# Patient Record
Sex: MALE | Race: BLACK OR AFRICAN AMERICAN | NOT HISPANIC OR LATINO | Employment: FULL TIME | URBAN - METROPOLITAN AREA
[De-identification: names, ages, dates, MRNs, and addresses within clinical notes are randomized per-mention and may not be internally consistent; named-entity substitution may affect disease eponyms.]

---

## 2021-09-07 ENCOUNTER — OFFICE VISIT (OUTPATIENT)
Dept: UROLOGY | Facility: CLINIC | Age: 66
End: 2021-09-07
Payer: MEDICARE

## 2021-09-07 DIAGNOSIS — N48.6 PEYRONIE'S DISEASE: ICD-10-CM

## 2021-09-07 DIAGNOSIS — N52.9 ERECTILE DYSFUNCTION, UNSPECIFIED ERECTILE DYSFUNCTION TYPE: Primary | ICD-10-CM

## 2021-09-07 DIAGNOSIS — C61 PROSTATE CANCER: ICD-10-CM

## 2021-09-07 DIAGNOSIS — N40.1 BPH WITH OBSTRUCTION/LOWER URINARY TRACT SYMPTOMS: ICD-10-CM

## 2021-09-07 DIAGNOSIS — N13.8 BPH WITH OBSTRUCTION/LOWER URINARY TRACT SYMPTOMS: ICD-10-CM

## 2021-09-07 LAB — PSA, DIAGNOSTIC: 0.62 NG/ML (ref 0–4)

## 2021-09-07 PROCEDURE — 1160F PR REVIEW ALL MEDS BY PRESCRIBER/CLIN PHARMACIST DOCUMENTED: ICD-10-PCS | Mod: CPTII,S$GLB,, | Performed by: UROLOGY

## 2021-09-07 PROCEDURE — 99204 OFFICE O/P NEW MOD 45 MIN: CPT | Mod: S$GLB,,, | Performed by: UROLOGY

## 2021-09-07 PROCEDURE — 1159F PR MEDICATION LIST DOCUMENTED IN MEDICAL RECORD: ICD-10-PCS | Mod: CPTII,S$GLB,, | Performed by: UROLOGY

## 2021-09-07 PROCEDURE — 1160F RVW MEDS BY RX/DR IN RCRD: CPT | Mod: CPTII,S$GLB,, | Performed by: UROLOGY

## 2021-09-07 PROCEDURE — 1159F MED LIST DOCD IN RCRD: CPT | Mod: CPTII,S$GLB,, | Performed by: UROLOGY

## 2021-09-07 PROCEDURE — 99204 PR OFFICE/OUTPT VISIT, NEW, LEVL IV, 45-59 MIN: ICD-10-PCS | Mod: S$GLB,,, | Performed by: UROLOGY

## 2021-09-07 RX ORDER — LISINOPRIL 20 MG/1
TABLET ORAL
COMMUNITY
Start: 2021-08-19

## 2021-09-07 RX ORDER — ROSUVASTATIN CALCIUM 20 MG/1
TABLET, COATED ORAL
COMMUNITY
Start: 2021-08-19 | End: 2022-09-08

## 2021-09-07 RX ORDER — TADALAFIL 20 MG/1
TABLET ORAL
COMMUNITY

## 2021-09-07 RX ORDER — TAMSULOSIN HYDROCHLORIDE 0.4 MG/1
CAPSULE ORAL
COMMUNITY
Start: 2021-09-01 | End: 2021-09-07 | Stop reason: SDUPTHER

## 2021-09-07 RX ORDER — DOXAZOSIN 4 MG/1
TABLET ORAL
COMMUNITY
Start: 2021-08-19 | End: 2022-09-08

## 2021-09-07 RX ORDER — LURASIDONE HYDROCHLORIDE 80 MG/1
TABLET, FILM COATED ORAL
COMMUNITY
Start: 2021-05-20

## 2021-09-07 RX ORDER — DIVALPROEX SODIUM 500 MG/1
TABLET, FILM COATED, EXTENDED RELEASE ORAL
COMMUNITY

## 2021-09-07 RX ORDER — BUPROPION HYDROCHLORIDE 100 MG/1
TABLET ORAL
COMMUNITY

## 2021-09-07 RX ORDER — HYDROCHLOROTHIAZIDE 25 MG/1
TABLET ORAL
COMMUNITY

## 2021-09-07 RX ORDER — VENLAFAXINE 75 MG/1
TABLET ORAL
COMMUNITY

## 2021-09-08 RX ORDER — TAMSULOSIN HYDROCHLORIDE 0.4 MG/1
CAPSULE ORAL
Qty: 30 CAPSULE | Refills: 11 | Status: SHIPPED | OUTPATIENT
Start: 2021-09-08 | End: 2022-09-08

## 2021-09-15 ENCOUNTER — TELEPHONE (OUTPATIENT)
Dept: UROLOGY | Facility: CLINIC | Age: 66
End: 2021-09-15

## 2022-09-08 ENCOUNTER — OFFICE VISIT (OUTPATIENT)
Dept: UROLOGY | Facility: CLINIC | Age: 67
End: 2022-09-08
Payer: MEDICARE

## 2022-09-08 VITALS
SYSTOLIC BLOOD PRESSURE: 143 MMHG | TEMPERATURE: 98 F | HEART RATE: 61 BPM | BODY MASS INDEX: 31.83 KG/M2 | DIASTOLIC BLOOD PRESSURE: 87 MMHG | WEIGHT: 235 LBS | HEIGHT: 72 IN | RESPIRATION RATE: 18 BRPM

## 2022-09-08 DIAGNOSIS — N52.9 ERECTILE DYSFUNCTION, UNSPECIFIED ERECTILE DYSFUNCTION TYPE: ICD-10-CM

## 2022-09-08 DIAGNOSIS — N13.8 BPH WITH OBSTRUCTION/LOWER URINARY TRACT SYMPTOMS: Primary | ICD-10-CM

## 2022-09-08 DIAGNOSIS — N48.6 PEYRONIE'S DISEASE: ICD-10-CM

## 2022-09-08 DIAGNOSIS — N40.1 BPH WITH OBSTRUCTION/LOWER URINARY TRACT SYMPTOMS: Primary | ICD-10-CM

## 2022-09-08 LAB — PSA, DIAGNOSTIC: 1.85 NG/ML (ref 0–4)

## 2022-09-08 PROCEDURE — 1126F PR PAIN SEVERITY QUANTIFIED, NO PAIN PRESENT: ICD-10-PCS | Mod: CPTII,S$GLB,, | Performed by: UROLOGY

## 2022-09-08 PROCEDURE — 3008F BODY MASS INDEX DOCD: CPT | Mod: CPTII,S$GLB,, | Performed by: UROLOGY

## 2022-09-08 PROCEDURE — 3079F PR MOST RECENT DIASTOLIC BLOOD PRESSURE 80-89 MM HG: ICD-10-PCS | Mod: CPTII,S$GLB,, | Performed by: UROLOGY

## 2022-09-08 PROCEDURE — 1159F MED LIST DOCD IN RCRD: CPT | Mod: CPTII,S$GLB,, | Performed by: UROLOGY

## 2022-09-08 PROCEDURE — 1160F RVW MEDS BY RX/DR IN RCRD: CPT | Mod: CPTII,S$GLB,, | Performed by: UROLOGY

## 2022-09-08 PROCEDURE — 99214 OFFICE O/P EST MOD 30 MIN: CPT | Mod: S$GLB,,, | Performed by: UROLOGY

## 2022-09-08 PROCEDURE — 3077F PR MOST RECENT SYSTOLIC BLOOD PRESSURE >= 140 MM HG: ICD-10-PCS | Mod: CPTII,S$GLB,, | Performed by: UROLOGY

## 2022-09-08 PROCEDURE — 1159F PR MEDICATION LIST DOCUMENTED IN MEDICAL RECORD: ICD-10-PCS | Mod: CPTII,S$GLB,, | Performed by: UROLOGY

## 2022-09-08 PROCEDURE — 1160F PR REVIEW ALL MEDS BY PRESCRIBER/CLIN PHARMACIST DOCUMENTED: ICD-10-PCS | Mod: CPTII,S$GLB,, | Performed by: UROLOGY

## 2022-09-08 PROCEDURE — 1126F AMNT PAIN NOTED NONE PRSNT: CPT | Mod: CPTII,S$GLB,, | Performed by: UROLOGY

## 2022-09-08 PROCEDURE — 3008F PR BODY MASS INDEX (BMI) DOCUMENTED: ICD-10-PCS | Mod: CPTII,S$GLB,, | Performed by: UROLOGY

## 2022-09-08 PROCEDURE — 1101F PR PT FALLS ASSESS DOC 0-1 FALLS W/OUT INJ PAST YR: ICD-10-PCS | Mod: CPTII,S$GLB,, | Performed by: UROLOGY

## 2022-09-08 PROCEDURE — 3288F PR FALLS RISK ASSESSMENT DOCUMENTED: ICD-10-PCS | Mod: CPTII,S$GLB,, | Performed by: UROLOGY

## 2022-09-08 PROCEDURE — 3288F FALL RISK ASSESSMENT DOCD: CPT | Mod: CPTII,S$GLB,, | Performed by: UROLOGY

## 2022-09-08 PROCEDURE — 99214 PR OFFICE/OUTPT VISIT, EST, LEVL IV, 30-39 MIN: ICD-10-PCS | Mod: S$GLB,,, | Performed by: UROLOGY

## 2022-09-08 PROCEDURE — 3077F SYST BP >= 140 MM HG: CPT | Mod: CPTII,S$GLB,, | Performed by: UROLOGY

## 2022-09-08 PROCEDURE — 3079F DIAST BP 80-89 MM HG: CPT | Mod: CPTII,S$GLB,, | Performed by: UROLOGY

## 2022-09-08 PROCEDURE — 1101F PT FALLS ASSESS-DOCD LE1/YR: CPT | Mod: CPTII,S$GLB,, | Performed by: UROLOGY

## 2022-09-08 RX ORDER — CALCIUM CARB/VITAMIN D3/VIT K1 500-500-40
TABLET,CHEWABLE ORAL
COMMUNITY

## 2022-09-08 RX ORDER — ASPIRIN 81 MG/1
TABLET ORAL
COMMUNITY

## 2022-09-08 RX ORDER — SILDENAFIL 100 MG/1
100 TABLET, FILM COATED ORAL DAILY PRN
Qty: 30 TABLET | Refills: 11 | Status: SHIPPED | OUTPATIENT
Start: 2022-09-08 | End: 2023-09-08

## 2022-09-08 RX ORDER — PHENYLPROPANOLAMINE/CLEMASTINE 75-1.34MG
TABLET, EXTENDED RELEASE ORAL
COMMUNITY

## 2022-09-08 RX ORDER — METFORMIN HYDROCHLORIDE 1000 MG/1
TABLET, FILM COATED, EXTENDED RELEASE ORAL
COMMUNITY

## 2022-09-08 RX ORDER — ROSUVASTATIN CALCIUM 40 MG/1
TABLET, COATED ORAL
COMMUNITY

## 2022-09-08 NOTE — PROGRESS NOTES
Subjective:       Patient ID: Vaibhav Patel is a 66 y.o. male.    Chief Complaint: No chief complaint on file.      HPI:  66-year-old male follow-up for Peyronie's disease erectile dysfunction and BPH with obstructive symptoms.  Patient has discontinued his Flomax as he stated that it made him dizzy and overall he is doing fine.  Patient states he is at 30 degree curvature dorsally at the mid shaft he recently got  has been taking Cialis with some moderate improvement    Past Medical History:   Past Medical History:   Diagnosis Date    Diabetes mellitus     Hypertension        Past Surgical Historical: History reviewed. No pertinent surgical history.     Medications:   Medication List with Changes/Refills   Current Medications    ASPIRIN (ECOTRIN) 81 MG EC TABLET    Ecotrin Low Strength 81 mg tablet,enteric coated   Take 1 tablet every day by oral route.    BUPROPION (WELLBUTRIN) 100 MG TABLET    bupropion HCl 100 mg tablet   Take 1 tablet twice a day by oral route.    CHOLECALCIFEROL, VITAMIN D3, 10 MCG (400 UNIT) CAP CAPSULE    cholecalciferol (vitamin D3)    DIVALPROEX ER (DEPAKOTE) 500 MG TB24    divalproex  mg tablet,extended release 24 hr   Take 1 tablet every day by oral route.    HYDROCHLOROTHIAZIDE (HYDRODIURIL) 25 MG TABLET    hydrochlorothiazide 25 mg tablet   Take 1 tablet every day by oral route.    IBUPROFEN 200 MG CAP    ibuprofen 200 mg capsule   Take 1 capsule every 6 hours by oral route.    LISINOPRIL (PRINIVIL,ZESTRIL) 20 MG TABLET    lisinopril 20 mg tablet   Take 1 tablet every day by oral route.    LURASIDONE (LATUDA) 80 MG TAB TABLET    Latuda 80 mg tablet   Take 1 tablet every day by oral route.    METFORMIN (GLUMETZA) 1000 MG (MOD) 24HR TABLET    metformin ER 1,000 mg 24 hr tablet,extended release   Take 1 tablet every day by oral route.    ROSUVASTATIN (CRESTOR) 40 MG TAB    Crestor 40 mg tablet   Take 1 tablet every day by oral route.    TADALAFIL (CIALIS) 20 MG TAB     tadalafil 20 mg tablet   Take 1 tablet every day by oral route.    VENLAFAXINE (EFFEXOR) 75 MG TABLET    venlafaxine 75 mg tablet   Take 1 tablet twice a day by oral route.   Discontinued Medications    DOXAZOSIN (CARDURA) 4 MG TABLET    doxazosin 4 mg tablet   Take 1 tablet every day by oral route.    ROSUVASTATIN (CRESTOR) 20 MG TABLET    rosuvastatin 20 mg tablet   Take 1 tablet every day by oral route at bedtime.    TAMSULOSIN (FLOMAX) 0.4 MG CAP    tamsulosin 0.4 mg capsule  Take 1 capsule every day by oral route.        Past Social History:   Social History     Socioeconomic History    Marital status:    Tobacco Use    Smoking status: Former     Years: 3.00     Types: Cigarettes     Quit date:      Years since quittin.7    Smokeless tobacco: Never   Substance and Sexual Activity    Alcohol use: Not Currently    Sexual activity: Yes     Partners: Female       Allergies: Review of patient's allergies indicates:  No Known Allergies     Family History: History reviewed. No pertinent family history.     Review of Systems:  Review of Systems    Physical Exam:  Physical Exam    Assessment/Plan:       Problem List Items Addressed This Visit    None  Visit Diagnoses       BPH with obstruction/lower urinary tract symptoms    -  Primary    Relevant Orders    Prostate Specific Antigen, Diagnostic    Peyronie's disease        Erectile dysfunction, unspecified erectile dysfunction type                   Peyronie's disease:  I palpated the patient's penis do not feel an obvious plaque making it difficult to proceed with Xiaflex    Erectile dysfunction:  We will prescribe 100 mg of Viagra to the patient believe this should work well if he takes on an empty stomach if we can get him having better quality erections that believe the Peyronie's disease should improve    BPH with obstruction:  Patient is stable return to clinic in 1 year or sooner

## 2022-09-12 ENCOUNTER — TELEPHONE (OUTPATIENT)
Dept: UROLOGY | Facility: CLINIC | Age: 67
End: 2022-09-12
Payer: MEDICARE

## 2022-09-12 NOTE — TELEPHONE ENCOUNTER
Informed pt of results.     ----- Message from Kings Hernandez MD sent at 9/8/2022  4:26 PM CDT -----  Inform pt of stable PSA

## 2022-10-06 ENCOUNTER — TELEPHONE (OUTPATIENT)
Dept: UROLOGY | Facility: CLINIC | Age: 67
End: 2022-10-06
Payer: MEDICARE

## 2022-10-06 RX ORDER — TADALAFIL 20 MG/1
TABLET ORAL
Qty: 30 TABLET | Refills: 11 | Status: CANCELLED | OUTPATIENT
Start: 2022-10-06

## 2022-10-06 RX ORDER — TADALAFIL 20 MG/1
20 TABLET ORAL DAILY
Qty: 30 TABLET | Refills: 11 | Status: SHIPPED | OUTPATIENT
Start: 2022-10-06 | End: 2023-10-06

## 2022-10-06 NOTE — TELEPHONE ENCOUNTER
Pt called stating his viagra is not working for him. Dr. Hernandez has sent out cialis 20mg .   Pt will call back if this does not help him.

## 2022-10-06 NOTE — TELEPHONE ENCOUNTER
Attempted to return pt call. Left VM.    ----- Message from Rita Farfan MA sent at 10/6/2022  9:11 AM CDT -----  Contact: self    ----- Message -----  From: Yulissa Sigala MA  Sent: 10/5/2022   4:54 PM CDT  To: Rita Farfan MA      ----- Message -----  From: Jasen Michaud  Sent: 10/5/2022  12:51 PM CDT  To: [facility] Staff    Pt called and asked for a call back regarding the Viagra not working. Pt stated he tried it a coiple times and it did not work. Please call pt at [phone]

## 2022-11-21 ENCOUNTER — TELEPHONE (OUTPATIENT)
Dept: UROLOGY | Facility: CLINIC | Age: 67
End: 2022-11-21
Payer: MEDICARE

## 2022-11-21 NOTE — TELEPHONE ENCOUNTER
Contacted pt, scheduled to discuss further options. BJP     ----- Message from Missy Olivas sent at 11/21/2022 10:08 AM CST -----  Contact: self  Type:  Needs Medical Advice    Who Called: Vaibhav Patel   Symptoms (please be specific):  Has been prescribed tadalafiL (CIALIS) 20 MG Tab and he has tried sildenafil and those both don't work for him - wanting to know if he can try something else. Inquiring about a shot that he can give himself. Wants to see if that's an option.    Pharmacy name and phone #:    [facility] - [address] [address]  Phone: [phone] Fax: [phone]    Would the patient rather a call back or a response via [facility]? Call back   Best Call Back Number:  [phone]   Additional Information: n/a

## 2022-11-23 ENCOUNTER — OFFICE VISIT (OUTPATIENT)
Dept: UROLOGY | Facility: CLINIC | Age: 67
End: 2022-11-23
Payer: MEDICARE

## 2022-11-23 DIAGNOSIS — N52.9 ERECTILE DYSFUNCTION, UNSPECIFIED ERECTILE DYSFUNCTION TYPE: Primary | ICD-10-CM

## 2022-11-23 PROCEDURE — 99214 PR OFFICE/OUTPT VISIT, EST, LEVL IV, 30-39 MIN: ICD-10-PCS | Mod: S$GLB,,, | Performed by: FAMILY MEDICINE

## 2022-11-23 PROCEDURE — 1160F RVW MEDS BY RX/DR IN RCRD: CPT | Mod: CPTII,S$GLB,, | Performed by: FAMILY MEDICINE

## 2022-11-23 PROCEDURE — 1160F PR REVIEW ALL MEDS BY PRESCRIBER/CLIN PHARMACIST DOCUMENTED: ICD-10-PCS | Mod: CPTII,S$GLB,, | Performed by: FAMILY MEDICINE

## 2022-11-23 PROCEDURE — 99214 OFFICE O/P EST MOD 30 MIN: CPT | Mod: S$GLB,,, | Performed by: FAMILY MEDICINE

## 2022-11-23 PROCEDURE — 1159F PR MEDICATION LIST DOCUMENTED IN MEDICAL RECORD: ICD-10-PCS | Mod: CPTII,S$GLB,, | Performed by: FAMILY MEDICINE

## 2022-11-23 PROCEDURE — 1159F MED LIST DOCD IN RCRD: CPT | Mod: CPTII,S$GLB,, | Performed by: FAMILY MEDICINE

## 2022-11-23 RX ORDER — ALPROSTADIL 20 UG/ML
20 INJECTION, POWDER, LYOPHILIZED, FOR SOLUTION INTRACAVERNOUS
Qty: 4 EACH | Refills: 4 | Status: SHIPPED | OUTPATIENT
Start: 2022-11-23 | End: 2022-11-28

## 2022-11-23 NOTE — PROGRESS NOTES
Subjective:       Patient ID: Vaibhav Patel is a 67 y.o. male.    Chief Complaint: Erectile Dysfunction      HPI: Erectile Dysfunction   Patient has been on Cialis and Viagra with no improvement of erections.    Past Medical History:   Past Medical History:   Diagnosis Date    Diabetes mellitus     Hypertension        Past Surgical Historical: History reviewed. No pertinent surgical history.     Medications:   Medication List with Changes/Refills   New Medications    ALPROSTADIL (EDEX) 20 MCG INJECTION    20 mcg by Intracavitary route as needed for Erectile Dysfunction (15 minutes prior to sexual activity). use no more than 3 times per week   Current Medications    ASPIRIN (ECOTRIN) 81 MG EC TABLET    Ecotrin Low Strength 81 mg tablet,enteric coated   Take 1 tablet every day by oral route.    BUPROPION (WELLBUTRIN) 100 MG TABLET    bupropion HCl 100 mg tablet   Take 1 tablet twice a day by oral route.    CHOLECALCIFEROL, VITAMIN D3, 10 MCG (400 UNIT) CAP CAPSULE    cholecalciferol (vitamin D3)    DIVALPROEX ER (DEPAKOTE) 500 MG TB24    divalproex  mg tablet,extended release 24 hr   Take 1 tablet every day by oral route.    HYDROCHLOROTHIAZIDE (HYDRODIURIL) 25 MG TABLET    hydrochlorothiazide 25 mg tablet   Take 1 tablet every day by oral route.    IBUPROFEN 200 MG CAP    ibuprofen 200 mg capsule   Take 1 capsule every 6 hours by oral route.    LISINOPRIL (PRINIVIL,ZESTRIL) 20 MG TABLET    lisinopril 20 mg tablet   Take 1 tablet every day by oral route.    LURASIDONE (LATUDA) 80 MG TAB TABLET    Latuda 80 mg tablet   Take 1 tablet every day by oral route.    METFORMIN (GLUMETZA) 1000 MG (MOD) 24HR TABLET    metformin ER 1,000 mg 24 hr tablet,extended release   Take 1 tablet every day by oral route.    ROSUVASTATIN (CRESTOR) 40 MG TAB    Crestor 40 mg tablet   Take 1 tablet every day by oral route.    SILDENAFIL (VIAGRA) 100 MG TABLET    Take 1 tablet (100 mg total) by mouth daily as needed for Erectile  Dysfunction.    TADALAFIL (CIALIS) 20 MG TAB    tadalafil 20 mg tablet   Take 1 tablet every day by oral route.    TADALAFIL (CIALIS) 20 MG TAB    Take 1 tablet (20 mg total) by mouth once daily.    VENLAFAXINE (EFFEXOR) 75 MG TABLET    venlafaxine 75 mg tablet   Take 1 tablet twice a day by oral route.        Past Social History:   Social History     Socioeconomic History    Marital status:    Tobacco Use    Smoking status: Former     Years: 3.00     Types: Cigarettes     Quit date:      Years since quittin.9    Smokeless tobacco: Never   Substance and Sexual Activity    Alcohol use: Not Currently    Sexual activity: Yes     Partners: Female       Allergies: Review of patient's allergies indicates:  No Known Allergies     Family History: History reviewed. No pertinent family history.     Review of Systems:  Review of Systems   Constitutional: Negative.    HENT: Negative.     Eyes: Negative.    Respiratory: Negative.     Cardiovascular: Negative.    Gastrointestinal: Negative.    Endocrine: Negative.    Genitourinary: Negative.    Musculoskeletal: Negative.    Skin: Negative.    Allergic/Immunologic: Negative.    Neurological: Negative.    Hematological: Negative.    Psychiatric/Behavioral: Negative.       Physical Exam:  Physical Exam  Constitutional:       Appearance: He is normal weight.   HENT:      Head: Normocephalic.      Mouth/Throat:      Mouth: Mucous membranes are moist.      Pharynx: Oropharynx is clear.   Cardiovascular:      Rate and Rhythm: Normal rate.   Pulmonary:      Effort: Pulmonary effort is normal.   Genitourinary:     Penis: Normal.    Musculoskeletal:      Cervical back: Normal range of motion.   Neurological:      General: No focal deficit present.      Mental Status: He is alert and oriented to person, place, and time. Mental status is at baseline.   Psychiatric:         Mood and Affect: Mood normal.         Behavior: Behavior normal.         Thought Content: Thought  content normal.         Judgment: Judgment normal.     Assessment/Plan:       Erectile dysfunction: The patient has tried Cialis and Viagra with no improvement in erections. He would like to try trimix injections at this time. Discussed treatment options for ED. Patient verbalized understanding. We will prescribe Edex 20 mcg by Intracavitary route as needed for Erectile Dysfunction (15 minutes prior to sexual activity). [facility] the [facility] issues [facility] we can send Trimix to [facility] locally.       Problem List Items Addressed This Visit    None  Visit Diagnoses       Erectile dysfunction, unspecified erectile dysfunction type    -  Primary    Relevant Medications    alprostadiL (EDEX) 20 mcg injection

## 2022-11-28 ENCOUNTER — TELEPHONE (OUTPATIENT)
Dept: UROLOGY | Facility: CLINIC | Age: 67
End: 2022-11-28
Payer: MEDICARE

## 2022-11-28 DIAGNOSIS — N52.9 ERECTILE DYSFUNCTION, UNSPECIFIED ERECTILE DYSFUNCTION TYPE: Primary | ICD-10-CM

## 2022-11-28 RX ORDER — ALPROSTADIL 20 UG/ML
20 INJECTION, POWDER, LYOPHILIZED, FOR SOLUTION INTRACAVERNOUS
Qty: 4 EACH | Refills: 4 | Status: SHIPPED | OUTPATIENT
Start: 2022-11-28 | End: 2022-12-08

## 2022-11-28 NOTE — TELEPHONE ENCOUNTER
Pt calling regarding his medication being sent to wrong pharmacy. I have [facility] and resent edex medication.     ----- Message from Anusha Townsend, RT sent at 11/28/2022 10:42 AM CST -----  Patient calling about medication going to the wrong location and he needs to speak with someone to correct the problem,, please call patient back [phone]

## 2022-12-08 ENCOUNTER — TELEPHONE (OUTPATIENT)
Dept: UROLOGY | Facility: CLINIC | Age: 67
End: 2022-12-08
Payer: MEDICARE

## 2022-12-08 DIAGNOSIS — N52.9 ERECTILE DYSFUNCTION, UNSPECIFIED ERECTILE DYSFUNCTION TYPE: Primary | ICD-10-CM

## 2022-12-08 RX ORDER — ALPROSTADIL 20 UG/ML
20 INJECTION, POWDER, LYOPHILIZED, FOR SOLUTION INTRACAVERNOUS
Qty: 4 EACH | Refills: 4 | Status: SHIPPED | OUTPATIENT
Start: 2022-12-08 | End: 2023-12-08

## 2022-12-08 NOTE — TELEPHONE ENCOUNTER
Trimix medication sent out.     ----- Message from Jasen Michaud sent at 12/8/2022  2:07 PM CST -----  Contact: SELF  Pt called and asked if he can get his medication called out to the pharmacy? Pt stated he is willing to pay cash. Pt want script send to [facility]. Thanks

## 2022-12-08 NOTE — TELEPHONE ENCOUNTER
PT called asking who referred pt to us. I informed him that he made gallo himself. Medication that we sent out will not be covered without [facility]. I advised pt to [facility] and have them send over auth. Pt verbalized understanding. Pt ----- Message from Janny Mckay sent at 12/8/2022  1:24 PM CST -----  Contact: pt    Who Called:mark   Who Left Message for Patient:  Does the patient know what this is regarding?:pt wants to know who sent referral over for him to see urologist   Would the patient rather a call back or a response via [facility]?   Best Call Back Number:.[phone]    Additional Information:

## 2022-12-08 NOTE — TELEPHONE ENCOUNTER
Pt called stating he did not receive is ED(Edex) medication. I informed him we received a receipt from pharmacy but that we will resend medication.      ----- Message from Janny Mckay sent at 12/8/2022  8:22 AM CST -----  Contact: pt  .Type:  Patient Returning Call    Who Called:mark  Who Left Message for Patient:  Does the patient know what this is regarding?:ed rx has not been sent   Would the patient rather a call back or a response via [facility]?   Best Call Back Number:.[phone]    Additional Information: pt wants call back asap

## 2023-03-16 ENCOUNTER — OFFICE VISIT (OUTPATIENT)
Dept: UROLOGY | Facility: CLINIC | Age: 68
End: 2023-03-16
Payer: MEDICARE

## 2023-03-16 VITALS
RESPIRATION RATE: 18 BRPM | HEART RATE: 63 BPM | TEMPERATURE: 98 F | SYSTOLIC BLOOD PRESSURE: 121 MMHG | HEIGHT: 72 IN | DIASTOLIC BLOOD PRESSURE: 74 MMHG | BODY MASS INDEX: 33.32 KG/M2 | WEIGHT: 246 LBS

## 2023-03-16 DIAGNOSIS — N52.9 ERECTILE DYSFUNCTION, UNSPECIFIED ERECTILE DYSFUNCTION TYPE: Primary | ICD-10-CM

## 2023-03-16 PROCEDURE — 3078F DIAST BP <80 MM HG: CPT | Mod: CPTII,S$GLB,, | Performed by: UROLOGY

## 2023-03-16 PROCEDURE — 1126F PR PAIN SEVERITY QUANTIFIED, NO PAIN PRESENT: ICD-10-PCS | Mod: CPTII,S$GLB,, | Performed by: UROLOGY

## 2023-03-16 PROCEDURE — 99214 PR OFFICE/OUTPT VISIT, EST, LEVL IV, 30-39 MIN: ICD-10-PCS | Mod: S$GLB,,, | Performed by: UROLOGY

## 2023-03-16 PROCEDURE — 1160F RVW MEDS BY RX/DR IN RCRD: CPT | Mod: CPTII,S$GLB,, | Performed by: UROLOGY

## 2023-03-16 PROCEDURE — 1160F PR REVIEW ALL MEDS BY PRESCRIBER/CLIN PHARMACIST DOCUMENTED: ICD-10-PCS | Mod: CPTII,S$GLB,, | Performed by: UROLOGY

## 2023-03-16 PROCEDURE — 3074F SYST BP LT 130 MM HG: CPT | Mod: CPTII,S$GLB,, | Performed by: UROLOGY

## 2023-03-16 PROCEDURE — 1159F MED LIST DOCD IN RCRD: CPT | Mod: CPTII,S$GLB,, | Performed by: UROLOGY

## 2023-03-16 PROCEDURE — 3078F PR MOST RECENT DIASTOLIC BLOOD PRESSURE < 80 MM HG: ICD-10-PCS | Mod: CPTII,S$GLB,, | Performed by: UROLOGY

## 2023-03-16 PROCEDURE — 3074F PR MOST RECENT SYSTOLIC BLOOD PRESSURE < 130 MM HG: ICD-10-PCS | Mod: CPTII,S$GLB,, | Performed by: UROLOGY

## 2023-03-16 PROCEDURE — 1159F PR MEDICATION LIST DOCUMENTED IN MEDICAL RECORD: ICD-10-PCS | Mod: CPTII,S$GLB,, | Performed by: UROLOGY

## 2023-03-16 PROCEDURE — 1126F AMNT PAIN NOTED NONE PRSNT: CPT | Mod: CPTII,S$GLB,, | Performed by: UROLOGY

## 2023-03-16 PROCEDURE — 3288F FALL RISK ASSESSMENT DOCD: CPT | Mod: CPTII,S$GLB,, | Performed by: UROLOGY

## 2023-03-16 PROCEDURE — 99214 OFFICE O/P EST MOD 30 MIN: CPT | Mod: S$GLB,,, | Performed by: UROLOGY

## 2023-03-16 PROCEDURE — 3288F PR FALLS RISK ASSESSMENT DOCUMENTED: ICD-10-PCS | Mod: CPTII,S$GLB,, | Performed by: UROLOGY

## 2023-03-16 PROCEDURE — 1101F PT FALLS ASSESS-DOCD LE1/YR: CPT | Mod: CPTII,S$GLB,, | Performed by: UROLOGY

## 2023-03-16 PROCEDURE — 3008F PR BODY MASS INDEX (BMI) DOCUMENTED: ICD-10-PCS | Mod: CPTII,S$GLB,, | Performed by: UROLOGY

## 2023-03-16 PROCEDURE — 3008F BODY MASS INDEX DOCD: CPT | Mod: CPTII,S$GLB,, | Performed by: UROLOGY

## 2023-03-16 PROCEDURE — 1101F PR PT FALLS ASSESS DOC 0-1 FALLS W/OUT INJ PAST YR: ICD-10-PCS | Mod: CPTII,S$GLB,, | Performed by: UROLOGY

## 2023-03-16 NOTE — PROGRESS NOTES
Subjective:       Patient ID: Vaibhav Patel is a 67 y.o. male.    Chief Complaint: Erectile Dysfunction (On trimix but does not feel it works all the time. He is curious about a higher dose)      HPI:  67-year-old male with erectile dysfunction he was  back in August of 22 and is already unfortunately having some marital issues in our is  he had intermittent success with TriMix currently he does not have a partner for intercourse but he is interested in penile implant potentially    Past Medical History:   Past Medical History:   Diagnosis Date    Diabetes mellitus     Hypertension     Male erectile dysfunction, unspecified        Past Surgical Historical: History reviewed. No pertinent surgical history.     Medications:   Medication List with Changes/Refills   Current Medications    ALPROSTADIL (EDEX) 20 MCG INJECTION    20 mcg by Intracavitary route as needed for Erectile Dysfunction (15 minutes prior to sexual activity). use no more than 3 times per week    ASPIRIN (ECOTRIN) 81 MG EC TABLET    Ecotrin Low Strength 81 mg tablet,enteric coated   Take 1 tablet every day by oral route.    BUPROPION (WELLBUTRIN) 100 MG TABLET    bupropion HCl 100 mg tablet   Take 1 tablet twice a day by oral route.    CHOLECALCIFEROL, VITAMIN D3, 10 MCG (400 UNIT) CAP CAPSULE    cholecalciferol (vitamin D3)    DIVALPROEX ER (DEPAKOTE) 500 MG TB24    divalproex  mg tablet,extended release 24 hr   Take 1 tablet every day by oral route.    HYDROCHLOROTHIAZIDE (HYDRODIURIL) 25 MG TABLET    hydrochlorothiazide 25 mg tablet   Take 1 tablet every day by oral route.    IBUPROFEN 200 MG CAP    ibuprofen 200 mg capsule   Take 1 capsule every 6 hours by oral route.    LISINOPRIL (PRINIVIL,ZESTRIL) 20 MG TABLET    lisinopril 20 mg tablet   Take 1 tablet every day by oral route.    LURASIDONE (LATUDA) 80 MG TAB TABLET    Latuda 80 mg tablet   Take 1 tablet every day by oral route.    METFORMIN (GLUMETZA) 1000 MG (MOD) 24HR  TABLET    metformin ER 1,000 mg 24 hr tablet,extended release   Take 1 tablet every day by oral route.    ROSUVASTATIN (CRESTOR) 40 MG TAB    Crestor 40 mg tablet   Take 1 tablet every day by oral route.    SILDENAFIL (VIAGRA) 100 MG TABLET    Take 1 tablet (100 mg total) by mouth daily as needed for Erectile Dysfunction.    TADALAFIL (CIALIS) 20 MG TAB    tadalafil 20 mg tablet   Take 1 tablet every day by oral route.    TADALAFIL (CIALIS) 20 MG TAB    Take 1 tablet (20 mg total) by mouth once daily.    VENLAFAXINE (EFFEXOR) 75 MG TABLET    venlafaxine 75 mg tablet   Take 1 tablet twice a day by oral route.        Past Social History:   Social History     Socioeconomic History    Marital status:    Tobacco Use    Smoking status: Former     Years: 3.00     Types: Cigarettes     Quit date:      Years since quittin.    Smokeless tobacco: Never   Substance and Sexual Activity    Alcohol use: Not Currently    Sexual activity: Yes     Partners: Female       Allergies: Review of patient's allergies indicates:  No Known Allergies     Family History:   Family History   Problem Relation Age of Onset    No Known Problems Father     No Known Problems Mother         Review of Systems:  Review of Systems   Constitutional:  Negative for activity change and appetite change.   HENT:  Negative for congestion and dental problem.    Eyes:  Negative for visual disturbance.   Respiratory:  Negative for chest tightness and shortness of breath.    Cardiovascular:  Negative for chest pain.   Gastrointestinal:  Negative for abdominal distention and abdominal pain.   Endocrine: Negative for polyuria.   Genitourinary:  Negative for difficulty urinating, dysuria, flank pain, frequency, hematuria, penile discharge, penile pain, penile swelling, scrotal swelling, testicular pain and urgency.   Musculoskeletal:  Negative for back pain and neck pain.   Skin:  Negative for color change.   Allergic/Immunologic: Positive for  immunocompromised state.   Neurological:  Negative for dizziness.   Hematological:  Negative for adenopathy.   Psychiatric/Behavioral:  Negative for agitation, behavioral problems and confusion.      Physical Exam:  Physical Exam  Constitutional:       General: He is not in acute distress.     Appearance: He is well-developed.   HENT:      Head: Normocephalic and atraumatic.      Nose: Nose normal.   Eyes:      General: No scleral icterus.     Conjunctiva/sclera: Conjunctivae normal.      Pupils: Pupils are equal, round, and reactive to light.   Neck:      Thyroid: No thyromegaly.      Trachea: No tracheal deviation.   Cardiovascular:      Rate and Rhythm: Normal rate and regular rhythm.      Heart sounds: Normal heart sounds.   Pulmonary:      Effort: Pulmonary effort is normal. No respiratory distress.      Breath sounds: Normal breath sounds. No wheezing or rales.   Abdominal:      General: Bowel sounds are normal. There is no distension.      Palpations: Abdomen is soft.      Tenderness: There is no abdominal tenderness. There is no guarding or rebound.   Genitourinary:     Penis: Normal. No tenderness.       Prostate: Normal.   Musculoskeletal:         General: No deformity. Normal range of motion.      Cervical back: Neck supple.   Lymphadenopathy:      Cervical: No cervical adenopathy.   Skin:     General: Skin is warm and dry.      Findings: No erythema or rash.   Neurological:      Mental Status: He is alert and oriented to person, place, and time.      Cranial Nerves: No cranial nerve deficit.   Psychiatric:         Behavior: Behavior normal.       Assessment/Plan:       Problem List Items Addressed This Visit    None  Visit Diagnoses       Erectile dysfunction, unspecified erectile dysfunction type    -  Primary               Erectile dysfunction failed TriMix:  We would a long discussion about penile implant including that his penis will be shorter and have less girth only should really consider this if  TriMix does not work at all he is going to consider his options and call back if he is interested

## 2023-08-16 ENCOUNTER — TELEPHONE (OUTPATIENT)
Dept: UROLOGY | Facility: CLINIC | Age: 68
End: 2023-08-16
Payer: MEDICARE

## 2023-08-16 NOTE — TELEPHONE ENCOUNTER
----- Message from Missy Olivas sent at 8/16/2023 11:13 AM CDT -----  Contact: SELF  Type:  RX Refill Request    Who Called:  Vaibhav Patel   Refill or New Rx: REFILL  RX Name and Strength: trimix (requesting to try the highest dose)  How is the patient currently taking it? (ex. 1XDay): as needed    Preferred Pharmacy with phone number:  [facility] - [place], [place] - [address]  [address] [facility] [address]  Phone: [phone] Fax: [phone]     Local or Mail Order: LOCAL  Ordering Provider: [facility]  Would the patient rather a call back or a response via [facility]?  CALL BACK  Best Call Back Number: [phone]   Additional Information: N/A

## 2023-08-16 NOTE — TELEPHONE ENCOUNTER
Trimix refill approved by Dr. Hernandez and called into [facility] pharmacy. [facility]

## 2023-08-17 ENCOUNTER — TELEPHONE (OUTPATIENT)
Dept: UROLOGY | Facility: CLINIC | Age: 68
End: 2023-08-17
Payer: MEDICARE

## 2023-09-08 ENCOUNTER — CLINICAL SUPPORT (OUTPATIENT)
Dept: UROLOGY | Facility: CLINIC | Age: 68
End: 2023-09-08
Payer: MEDICARE

## 2023-09-08 DIAGNOSIS — N40.1 BPH WITH OBSTRUCTION/LOWER URINARY TRACT SYMPTOMS: Primary | ICD-10-CM

## 2023-09-08 DIAGNOSIS — N13.8 BPH WITH OBSTRUCTION/LOWER URINARY TRACT SYMPTOMS: Primary | ICD-10-CM

## 2023-09-08 LAB — PSA, DIAGNOSTIC: 0.8 NG/ML (ref 0.1–4)

## 2023-09-08 PROCEDURE — 36415 COLL VENOUS BLD VENIPUNCTURE: CPT | Mod: S$GLB,,, | Performed by: UROLOGY

## 2023-09-08 PROCEDURE — 36415 PR COLLECTION VENOUS BLOOD,VENIPUNCTURE: ICD-10-PCS | Mod: S$GLB,,, | Performed by: UROLOGY

## 2023-09-14 ENCOUNTER — OFFICE VISIT (OUTPATIENT)
Dept: UROLOGY | Facility: CLINIC | Age: 68
End: 2023-09-14
Payer: MEDICARE

## 2023-09-14 VITALS
HEIGHT: 72 IN | TEMPERATURE: 98 F | BODY MASS INDEX: 33.86 KG/M2 | HEART RATE: 68 BPM | WEIGHT: 250 LBS | DIASTOLIC BLOOD PRESSURE: 81 MMHG | SYSTOLIC BLOOD PRESSURE: 144 MMHG | OXYGEN SATURATION: 99 %

## 2023-09-14 DIAGNOSIS — N52.9 ERECTILE DYSFUNCTION, UNSPECIFIED ERECTILE DYSFUNCTION TYPE: Primary | ICD-10-CM

## 2023-09-14 PROCEDURE — 1160F PR REVIEW ALL MEDS BY PRESCRIBER/CLIN PHARMACIST DOCUMENTED: ICD-10-PCS | Mod: CPTII,S$GLB,, | Performed by: UROLOGY

## 2023-09-14 PROCEDURE — 1160F RVW MEDS BY RX/DR IN RCRD: CPT | Mod: CPTII,S$GLB,, | Performed by: UROLOGY

## 2023-09-14 PROCEDURE — 99215 PR OFFICE/OUTPT VISIT, EST, LEVL V, 40-54 MIN: ICD-10-PCS | Mod: S$GLB,,, | Performed by: UROLOGY

## 2023-09-14 PROCEDURE — 99215 OFFICE O/P EST HI 40 MIN: CPT | Mod: S$GLB,,, | Performed by: UROLOGY

## 2023-09-14 PROCEDURE — 3079F DIAST BP 80-89 MM HG: CPT | Mod: CPTII,S$GLB,, | Performed by: UROLOGY

## 2023-09-14 PROCEDURE — 3008F PR BODY MASS INDEX (BMI) DOCUMENTED: ICD-10-PCS | Mod: CPTII,S$GLB,, | Performed by: UROLOGY

## 2023-09-14 PROCEDURE — 3077F PR MOST RECENT SYSTOLIC BLOOD PRESSURE >= 140 MM HG: ICD-10-PCS | Mod: CPTII,S$GLB,, | Performed by: UROLOGY

## 2023-09-14 PROCEDURE — 3079F PR MOST RECENT DIASTOLIC BLOOD PRESSURE 80-89 MM HG: ICD-10-PCS | Mod: CPTII,S$GLB,, | Performed by: UROLOGY

## 2023-09-14 PROCEDURE — 3008F BODY MASS INDEX DOCD: CPT | Mod: CPTII,S$GLB,, | Performed by: UROLOGY

## 2023-09-14 PROCEDURE — 3077F SYST BP >= 140 MM HG: CPT | Mod: CPTII,S$GLB,, | Performed by: UROLOGY

## 2023-09-14 PROCEDURE — 1159F PR MEDICATION LIST DOCUMENTED IN MEDICAL RECORD: ICD-10-PCS | Mod: CPTII,S$GLB,, | Performed by: UROLOGY

## 2023-09-14 PROCEDURE — 1159F MED LIST DOCD IN RCRD: CPT | Mod: CPTII,S$GLB,, | Performed by: UROLOGY

## 2023-09-14 NOTE — PROGRESS NOTES
Subjective:       Patient ID: Vaibhav Patel is a 67 y.o. male.    Chief Complaint: Elevated PSA      HPI:  67-year-old male with erectile dysfunction he has been using TriMix with mixed success he states he is scared to use more than 30 units he is interested in a penile implant    Past Medical History:   Past Medical History:   Diagnosis Date    Diabetes mellitus     Hypertension     Male erectile dysfunction, unspecified        Past Surgical Historical: History reviewed. No pertinent surgical history.     Medications:   Medication List with Changes/Refills   Current Medications    ALPROSTADIL (EDEX) 20 MCG INJECTION    20 mcg by Intracavitary route as needed for Erectile Dysfunction (15 minutes prior to sexual activity). use no more than 3 times per week    ASPIRIN (ECOTRIN) 81 MG EC TABLET    Ecotrin Low Strength 81 mg tablet,enteric coated   Take 1 tablet every day by oral route.    BUPROPION (WELLBUTRIN) 100 MG TABLET    bupropion HCl 100 mg tablet   Take 1 tablet twice a day by oral route.    CHOLECALCIFEROL, VITAMIN D3, 10 MCG (400 UNIT) CAP CAPSULE    cholecalciferol (vitamin D3)    DIVALPROEX ER (DEPAKOTE) 500 MG TB24    divalproex  mg tablet,extended release 24 hr   Take 1 tablet every day by oral route.    HYDROCHLOROTHIAZIDE (HYDRODIURIL) 25 MG TABLET    hydrochlorothiazide 25 mg tablet   Take 1 tablet every day by oral route.    IBUPROFEN 200 MG CAP    ibuprofen 200 mg capsule   Take 1 capsule every 6 hours by oral route.    LISINOPRIL (PRINIVIL,ZESTRIL) 20 MG TABLET    lisinopril 20 mg tablet   Take 1 tablet every day by oral route.    LURASIDONE (LATUDA) 80 MG TAB TABLET    Latuda 80 mg tablet   Take 1 tablet every day by oral route.    METFORMIN (GLUMETZA) 1000 MG (MOD) 24HR TABLET    metformin ER 1,000 mg 24 hr tablet,extended release   Take 1 tablet every day by oral route.    ROSUVASTATIN (CRESTOR) 40 MG TAB    Crestor 40 mg tablet   Take 1 tablet every day by oral route.    SILDENAFIL  (VIAGRA) 100 MG TABLET    Take 1 tablet (100 mg total) by mouth daily as needed for Erectile Dysfunction.    TADALAFIL (CIALIS) 20 MG TAB    tadalafil 20 mg tablet   Take 1 tablet every day by oral route.    TADALAFIL (CIALIS) 20 MG TAB    Take 1 tablet (20 mg total) by mouth once daily.    VENLAFAXINE (EFFEXOR) 75 MG TABLET    venlafaxine 75 mg tablet   Take 1 tablet twice a day by oral route.        Past Social History:   Social History     Socioeconomic History    Marital status:    Tobacco Use    Smoking status: Former     Current packs/day: 0.00     Types: Cigarettes     Start date:      Quit date:      Years since quittin.7    Smokeless tobacco: Never   Substance and Sexual Activity    Alcohol use: Not Currently    Sexual activity: Yes     Partners: Female       Allergies: Review of patient's allergies indicates:  No Known Allergies     Family History:   Family History   Problem Relation Age of Onset    No Known Problems Father     No Known Problems Mother         Review of Systems:  Review of Systems   Constitutional:  Negative for activity change and appetite change.   HENT:  Negative for congestion and dental problem.    Eyes:  Negative for visual disturbance.   Respiratory:  Negative for chest tightness and shortness of breath.    Cardiovascular:  Negative for chest pain.   Gastrointestinal:  Negative for abdominal distention and abdominal pain.   Endocrine: Negative for polyuria.   Genitourinary:  Negative for difficulty urinating, dysuria, flank pain, frequency, hematuria, penile discharge, penile pain, penile swelling, scrotal swelling, testicular pain and urgency.   Musculoskeletal:  Negative for back pain and neck pain.   Skin:  Negative for color change.   Allergic/Immunologic: Positive for immunocompromised state.   Neurological:  Negative for dizziness.   Hematological:  Negative for adenopathy.   Psychiatric/Behavioral:  Negative for agitation, behavioral problems and  confusion.        Physical Exam:  Physical Exam  Constitutional:       General: He is not in acute distress.     Appearance: He is well-developed.   HENT:      Head: Normocephalic and atraumatic.      Nose: Nose normal.   Eyes:      General: No scleral icterus.     Conjunctiva/sclera: Conjunctivae normal.      Pupils: Pupils are equal, round, and reactive to light.   Neck:      Thyroid: No thyromegaly.      Trachea: No tracheal deviation.   Cardiovascular:      Rate and Rhythm: Normal rate and regular rhythm.      Heart sounds: Normal heart sounds.   Pulmonary:      Effort: Pulmonary effort is normal. No respiratory distress.      Breath sounds: Normal breath sounds. No wheezing or rales.   Abdominal:      General: Bowel sounds are normal. There is no distension.      Palpations: Abdomen is soft.      Tenderness: There is no abdominal tenderness. There is no guarding or rebound.   Genitourinary:     Penis: Normal. No tenderness.       Prostate: Normal.   Musculoskeletal:         General: No deformity. Normal range of motion.      Cervical back: Neck supple.   Lymphadenopathy:      Cervical: No cervical adenopathy.   Skin:     General: Skin is warm and dry.      Findings: No erythema or rash.   Neurological:      Mental Status: He is alert and oriented to person, place, and time.      Cranial Nerves: No cranial nerve deficit.   Psychiatric:         Behavior: Behavior normal.         Assessment/Plan:       Problem List Items Addressed This Visit    None         Erectile dysfunction failed TriMix:  We would a long discussion about penile implant including that his penis will be shorter and have less girth only should really consider this if TriMix does not work at all he is going to consider his options and call back if he is interested  I, Dr. Kings Hernandez have seen and personally evaluated the patient. I have formulated the plan reviewed all pertinent imaging and clinical data.  I agree with the nurse  practitioner's assessment, and I have personally formulated the plan for this patient's care as described by the midlevel.  I spent significant amount of time explaining the pros and cons of an implant at this point he is still able to use TriMix I would recommend he continue doing that until he no longer is able